# Patient Record
Sex: MALE | Race: WHITE | NOT HISPANIC OR LATINO | URBAN - NONMETROPOLITAN AREA
[De-identification: names, ages, dates, MRNs, and addresses within clinical notes are randomized per-mention and may not be internally consistent; named-entity substitution may affect disease eponyms.]

---

## 2018-08-15 PROBLEM — H04.123: Noted: 2019-02-11

## 2018-08-15 PROBLEM — H35.3131: Noted: 2018-08-15

## 2018-08-15 PROBLEM — H35.363: Noted: 2018-08-15

## 2018-08-15 PROBLEM — Z96.1: Noted: 2019-02-11

## 2019-02-11 ENCOUNTER — IMPORTED ENCOUNTER (OUTPATIENT)
Dept: URBAN - NONMETROPOLITAN AREA CLINIC 1 | Facility: CLINIC | Age: 84
End: 2019-02-11

## 2019-02-11 PROCEDURE — 92014 COMPRE OPH EXAM EST PT 1/>: CPT

## 2019-02-11 PROCEDURE — 92134 CPTRZ OPH DX IMG PST SGM RTA: CPT

## 2019-02-11 NOTE — PATIENT DISCUSSION
AMD - dry/soft drusen OU -OCT MAC performed today and reviewed with patient stable/no change-Explained dry AMD and advised that there are no treatments available at this time.-Continue AREDS 2 MVT. -Continue Amsler grid monitoring daily. Pt is to contact our office if any changes are noted. -order OCT MAC next [address] IOL OU-stable continue to monitorDES OU-Discussed findings with patient.-recommend Refresh Optive Gel Drops OU

## 2020-02-14 ENCOUNTER — IMPORTED ENCOUNTER (OUTPATIENT)
Dept: URBAN - NONMETROPOLITAN AREA CLINIC 1 | Facility: CLINIC | Age: 85
End: 2020-02-14

## 2020-02-14 PROCEDURE — 99213 OFFICE O/P EST LOW 20 MIN: CPT

## 2020-02-14 NOTE — PATIENT DISCUSSION
AMD - dry/soft drusen OU -OCT MAC performed today and reviewed w/pt -Explained dry AMD and advised that there are no treatments available at this time.-Continue AREDS 2 MVT. -Continue Amsler grid monitoring daily. Pt is to contact our office if any changes are noted. -order OCT MAC PC IOL OU-stable continue to monitorDES OU-Discussed findings with patient.-recommend Refresh Optive Gel Drops OU

## 2020-07-31 ENCOUNTER — IMPORTED ENCOUNTER (OUTPATIENT)
Dept: URBAN - NONMETROPOLITAN AREA CLINIC 1 | Facility: CLINIC | Age: 85
End: 2020-07-31

## 2020-07-31 PROCEDURE — 92014 COMPRE OPH EXAM EST PT 1/>: CPT

## 2020-07-31 PROCEDURE — 92134 CPTRZ OPH DX IMG PST SGM RTA: CPT

## 2020-08-10 ENCOUNTER — IMPORTED ENCOUNTER (OUTPATIENT)
Dept: URBAN - NONMETROPOLITAN AREA CLINIC 1 | Facility: CLINIC | Age: 85
End: 2020-08-10

## 2020-08-10 PROCEDURE — 92014 COMPRE OPH EXAM EST PT 1/>: CPT

## 2020-08-10 PROCEDURE — 92134 CPTRZ OPH DX IMG PST SGM RTA: CPT

## 2020-08-10 NOTE — PATIENT DISCUSSION
AMD - dry/soft drusen OU -OCT MAC performed today and reviewed w/pt stable-Explained dry AMD-Continue AREDS 2 MVT. -Continue Amsler grid monitoring daily.  -order OCT MAC PC IOL OU-stable continue to monitorDES OU-Discussed findings with patient.-recommend Refresh Optive Gel Drops OU

## 2020-12-14 ENCOUNTER — IMPORTED ENCOUNTER (OUTPATIENT)
Dept: URBAN - NONMETROPOLITAN AREA CLINIC 1 | Facility: CLINIC | Age: 85
End: 2020-12-14

## 2020-12-14 PROCEDURE — 92134 CPTRZ OPH DX IMG PST SGM RTA: CPT

## 2020-12-14 PROCEDURE — 92012 INTRM OPH EXAM EST PATIENT: CPT

## 2020-12-14 NOTE — PATIENT DISCUSSION
AMD - dry/soft drusen OU -OCT MAC performed today and reviewed w/pt-Explained dry AMD-Continue AREDS 2 MVT. -Continue Amsler grid monitoring daily.  -order OCT MAC PC IOL OU-stable continue to monitorDES OU-Discussed findings with patient.-recommend Refresh Optive Gel Drops OU

## 2021-02-01 ENCOUNTER — IMPORTED ENCOUNTER (OUTPATIENT)
Dept: URBAN - NONMETROPOLITAN AREA CLINIC 1 | Facility: CLINIC | Age: 86
End: 2021-02-01

## 2021-02-01 PROCEDURE — 92012 INTRM OPH EXAM EST PATIENT: CPT

## 2021-02-01 PROCEDURE — 92134 CPTRZ OPH DX IMG PST SGM RTA: CPT

## 2021-02-01 NOTE — PATIENT DISCUSSION
AMD - dry/soft drusen OU -OCT MAC performed today and reviewed w/pt-Explained dry AMD-Continue AREDS 2 MVT. -Continue Amsler grid monitoring daily.  refer to retina for continuing care PC IOL OU-stable continue to monitorDES OU-Discussed findings with patient.-recommend Refresh Optive Gel Drops OU

## 2021-02-25 PROBLEM — R30.0 DYSURIA: Status: ACTIVE | Noted: 2019-03-13

## 2022-04-09 ASSESSMENT — TONOMETRY
OS_IOP_MMHG: 16
OD_IOP_MMHG: 17
OS_IOP_MMHG: 16
OD_IOP_MMHG: 16
OS_IOP_MMHG: 16
OD_IOP_MMHG: 14
OS_IOP_MMHG: 15
OS_IOP_MMHG: 16
OD_IOP_MMHG: 16
OS_IOP_MMHG: 14

## 2022-04-09 ASSESSMENT — VISUAL ACUITY
OS_SC: 20/30
OD_PH: 20/40+3
OS_SC: 20/40
OD_SC: 20/40+2
OS_PH: 20/30+
OD_SC: 20/40
OS_SC: 20/40+
OS_SC: 20/30
OU_CC: 20/30
OD_SC: 20/30-2
OD_SC: 20/30-2
OD_SC: 20/50+3
OU_SC: 20/40+
OS_SC: 20/30
OS_SC: 20/30-3
OD_SC: 20/30-2